# Patient Record
(demographics unavailable — no encounter records)

---

## 2024-11-08 NOTE — ASSESSMENT
[FreeTextEntry1] : This is an 91-year-old female with type 2 diabetes mellitus with retinopathy, nephropathy, thyroid nodules, anemia, hypertension, hyperlipidemia, osteoporosis, here for follow up.  1. Type 2 diabetes mellitus A1c 7.4% -Can continue tresiba 2-4 units qhs if needed -Hold repaglinide and Januvia for now given joint pain -Continue metformin 1g BID -Continue Jardiance 10mg daily. Discussed risks/benefits/side effects. Hold 3 days before surgery or prolonged fast.  2. HLD She takes atorvastatin 10mg qday. LDL slightly above goal but family okay with this slightly elevated number without increasing statin. They want to avoid increasing meds if possible.  3. HTN She was on enalapril 20 mg daily but has been holding this due to lower BP.  4.osteopenia She needs treatment based on FRAX score. Prolia: 3/15/2022, 9/9/2022, 5/9/2023, 11/17/23, 5/10/24, 11/8/24, next due mid-May 2025 She was taking vitamin D 5000 IU twice weekly but stopped this due to Vit D 25 OH over 100. No interval thigh/groin pain, dental issues (has full dentures). No interval falls. No fractures. Repeat DXA reviewed, stable (Sept 2023) Repeat scan  5. Thyroid nodules -thyroid ultrasound Sept 2023 stable -TFTs normal. -Will repeat US now  RTC 6 months  Dev Hooks DO.

## 2024-11-08 NOTE — HISTORY OF PRESENT ILLNESS
[FreeTextEntry1] : She is accompanied by her daughter. CC: Diabetes This is an 91-year-old female with type 2 diabetes mellitus with retinopathy, nephropathy, hypertension, hyponatremia, anemia, thyroid nodules, hyperlipidemia, osteoporosis, here for follow up.  Type 2 diabetes mellitus was diagnosed at the age of 56. She is currently on metformin  mg 2 tablets 2 times a day, now off repaglinide due to joint pain as well as Januvia. Uses Tresiba 2-4 units qhs if BG above 160. On Jardiance 10mg daily. She developed itching and diarrhea on Lantus.  She was on glipizide in the past.  She developed diarrhea on immediate release metformin. No hx of DKA. A1c 7.4% She self-monitors blood glucose 1 time a day at 9pm before Tresiba. BG 140s to around 180, occasionally 200 on a bad day. She denies hypoglycemia. In the morning BG closer to 100. She saw her ophthalmologist in 8/2019 and has retinopathy. Had an appt with ophtho for June 2024, no changes, no progression. She has nephropathy and saw a nephrologist however had not had a follow-up appointment in approximately 3 years. Saw nephro recently in 2024, just monitoring.  She denies neuropathy. She takes atorvastatin 10mg qday. LDL 82 but family okay with this without increasing statin. She was on enalapril 20 mg daily but has been holding this due to lower BP. She was on ?fosamax in the past for osteoporosis for appx 7 years and discontinued it approximately age 85.  Prolia: 3/15/2022, 9/9/2022, 5/9/2023, 11/17/23, 5/10/24, 11/8/24, next due mid May 2025. She was taking vitamin D 5000 IU twice weekly but stopped this due to Vit D 25 OH over 100. No interval thigh/groin pain, dental issues (has full dentures). No interval falls. No fractures.  She underwent MRI of the neck in February 2022 for swelling on left mandible, per daughter likely due to salivary gland.  MRI showed bilateral thyroid nodules.  No prior history of thyroid nodules.  Right 2.4 cm thyroid nodule and right 3.8 cm thyroid nodule benign (FNA June 2022). Thyroid US Sept 2023 stable. Due for repeat thyroid US.

## 2025-03-04 NOTE — PHYSICAL EXAM
[No Acute Distress] : no acute distress [Well Developed] : well developed [Well-Appearing] : well-appearing [Normal Sclera/Conjunctiva] : normal sclera/conjunctiva [EOMI] : extraocular movements intact [Supple] : supple [No Respiratory Distress] : no respiratory distress  [No Accessory Muscle Use] : no accessory muscle use [Clear to Auscultation] : lungs were clear to auscultation bilaterally [Normal Rate] : normal rate  [Regular Rhythm] : with a regular rhythm [Normal S1, S2] : normal S1 and S2 [Grossly Normal Strength/Tone] : grossly normal strength/tone [No Focal Deficits] : no focal deficits [Normal Affect] : the affect was normal [Normal Insight/Judgement] : insight and judgment were intact

## 2025-03-04 NOTE — PLAN
[FreeTextEntry1] : #Seizure -F/u with neurologist -Continue levetiracetam   #HTN -Repeat BP improved -EKG done today in office- sinus rhythm, low voltage, infarct- q waves in lead III and V2 -Monitor BP at home -Cardiology consult  #DM2 -A1C, CBC w. diff, CMP, TSH w. rFT4, UA + micro, vitamin b12   Labs drawn in office

## 2025-03-04 NOTE — HISTORY OF PRESENT ILLNESS
[Family Member] : family member [FreeTextEntry1] : f/u after seizure [de-identified] : 93 y/o female presents for follow up after seizure. Daughter at bedside states about 2 months ago patient had episode of altered mental status where patient's mouth was open, making grunting noises, drooling and eyes were staring into space and she was unable to speak. When patient became responsive she did not know who her daughter was. She was evaluated in [facility] in [place] and was prescribed levetiracetam 250 mg (2 tab) BID. Pt has not followed up with a neurologist and daughter reports Brain imaging showed evidence of head trauma in past. Daughter does not have discharge paperwork and requests levetiracetam refill. Daughter states patient's blood pressure was low in the hospital last year and enalapril and amlodipine she discontinued.  As per daughter BP at home - 129/68 but in office patient's blood pressure is high.   Social hx-  wheelchair bound, unable to use walker  Pt lives with daughter and

## 2025-03-24 NOTE — REVIEW OF SYSTEMS
None [Fever] : no fever [Headache] : no headache [Chills] : no chills [Feeling Fatigued] : not feeling fatigued [Blurry Vision] : no blurred vision [Seeing Double (Diplopia)] : no diplopia [SOB] : no shortness of breath [Dyspnea on exertion] : not dyspnea during exertion [Chest Discomfort] : no chest discomfort [Lower Ext Edema] : no extremity edema [Leg Claudication] : no intermittent leg claudication [Palpitations] : no palpitations [Orthopnea] : no orthopnea [PND] : no PND [Syncope] : no syncope [Dizziness] : no dizziness [Tremor] : no tremor was seen [Numbness (Hypoesthesia)] : no numbness [Convulsions] : no convulsions [Tingling (Paresthesia)] : no tingling [Weakness] : no weakness [Limb Weakness (Paresis)] : no limb weakness (Paresis) [Speech Disturbance] : no speech disturbance [Confusion] : no confusion was observed [Memory Lapses Or Loss] : no memory lapses or loss [Depression] : no depression [Anxiety] : no anxiety [Under Stress] : not under stress [Suicidal] : not suicidal [Easy Bleeding] : no tendency for easy bleeding [Swollen Glands] : no swollen glands [Easy Bruising] : no tendency for easy bruising

## 2025-03-24 NOTE — ASSESSMENT
[FreeTextEntry1] : MANISHA TRUONG is a 92 year F with past medical history significant for HTN, HLD, seizure and DM2. She is here for cardiac evaluation. She has chronic limited functional capacity. BP is well controlled at home off meds.   - I reviewed ECG - I reviewed labs - Schedule an echo to evaluate for structural heart disease - continue taking cardiac meds - Increase ambulation as tolerated to aim for approximately 30 minutes of moderate activity 5 days a week.  Heart healthy diet low in sodium, sugars and saturated fats and high in fruits, vegetables and whole grains.  Weight loss.  Patient advised to go to the nearest ED whenever any symptoms persist and/or worsens.  Patient/Family/Caregiver verbalized complete understanding of these instructions.  I spent a total of 45 minutes with more than 50% spent on counseling and coordinating care.  RTO in 6 wks.

## 2025-03-24 NOTE — HISTORY OF PRESENT ILLNESS
[FreeTextEntry1] : MANISHA TRUONG is a 92 y.o. F with medical history significant for HTN (>30yrs), HLD and DM2 She is here for cardiac evaluation. She is here with her daughter who provides most of the history.   [place] [place] for a first episode of seizure. Keppra was started. CT head showed chronic microvascular ischemic changes. Trop was neg. She was not admitted.  She was previously on amlodipine and enalapril. Amlodipine was stopped last year after having a syncopal episode and noted hypotension. The enalapril was stopped about 6 mo ago due to normal BP readings at home off of it and felt that it was best to remain off of it to avoid symptomatic low BP.  BP today is elevated in the office, however, her home BP readings range 105-129/60s mmHg Daughter states that the patient sleeps most of the day.   Denies CP, SOB, palpitations, orthopnea, dizziness, syncope, LH, edema Patient denies changes in her exercise tolerance during the past 6 months. She has very limited functional capacity. She has been mostly on wheelchair during the past 6 months. Prior to this she walked slowly with a walker.   Sleeps with 1 pillow  She denies history of MI, CVA Denies family history of premature ASCVD and /or SCD  No hospitalizations in the past 3 years.  MEDS Atorvastatin 10 mg jardiance 10 mg metformin 500mg  tresiba levetiracetam 250mg    DATA Labs (3/4/25): TSH 2.02, K 4.0, CRE 0.42, AST 17, ALT 7, ALK 80, eGFR 92, , , HDL 73, LDL 77, NON-HDL 99, A1C 7.7, HGB 11.9, HCT 38.2. (1/17/25): Hgb 10.9; Hct 34.2; Plt 391; Cre 0.5; K 4.3; eGFR 88; Trop <3  ECG (3/4/25):  NSR at 84bpm w nl axis, septal Qs (V1-V2) and no STT abn  CTA Head and neck (1/17/25)): No evidence of an acute large vessel IC arterial occlusion. Question asymmetric slightly decreased arborization of the right distal MCA territory in the right temporal occipital region.  Atherosclerosis within the CCA b/l <10%.   CT Head (1/17/25): Moderate chronic microvascular ischemic changes. Hypodensity within the right temporal lobe and temporal occipital lobe likely reflets a chronic infarct with associated ex vacuo dilatation of the right temporal horn. Small chronic lacunar infarct within the left caudate head.

## 2025-03-24 NOTE — PHYSICAL EXAM
[Well Nourished] : well nourished [No Acute Distress] : no acute distress [Normal Venous Pressure] : normal venous pressure [No Carotid Bruit] : no carotid bruit [Normal S1, S2] : normal S1, S2 [No Murmur] : no murmur [No Rub] : no rub [Clear Lung Fields] : clear lung fields [Good Air Entry] : good air entry [No Respiratory Distress] : no respiratory distress  [No Edema] : no edema [No Cyanosis] : no cyanosis [No Clubbing] : no clubbing [No Varicosities] : no varicosities [Moves all extremities] : moves all extremities [No Focal Deficits] : no focal deficits [Normal Speech] : normal speech [Alert and Oriented] : alert and oriented [Frail] : frail

## 2025-03-30 NOTE — PLAN
[FreeTextEntry1] :   #Abnormal UA- May be 2/2 jardiance, UA + micro, urine culture #Thyroid nodule- US thyroid #DM2- Jardiance and metformin  F/u in 3 months

## 2025-03-30 NOTE — PHYSICAL EXAM
[No Acute Distress] : no acute distress [Well Developed] : well developed [Well-Appearing] : well-appearing [Normal Sclera/Conjunctiva] : normal sclera/conjunctiva [EOMI] : extraocular movements intact [Supple] : supple [No Respiratory Distress] : no respiratory distress  [No Accessory Muscle Use] : no accessory muscle use [Clear to Auscultation] : lungs were clear to auscultation bilaterally [Normal Rate] : normal rate  [Regular Rhythm] : with a regular rhythm [Normal S1, S2] : normal S1 and S2 [Soft] : abdomen soft [Non Tender] : non-tender [Non-distended] : non-distended [No Focal Deficits] : no focal deficits [Normal Affect] : the affect was normal [Normal Insight/Judgement] : insight and judgment were intact

## 2025-03-30 NOTE — HISTORY OF PRESENT ILLNESS
[FreeTextEntry1] : f/u UTI [de-identified] : 91 y/o female presents for follow up after abnormal urinalysis. Daughter notes patient has been fatigued which she attributes to levetiracetam. Pt completed bactrim DS BID x 5 days.

## 2025-05-06 NOTE — HISTORY OF PRESENT ILLNESS
[FreeTextEntry1] : She is accompanied by her daughter. CC: Diabetes This is an 92-year-old female with type 2 diabetes mellitus with retinopathy, nephropathy, hypertension, hyponatremia, anemia, thyroid nodules, hyperlipidemia, osteoporosis, here for follow up.  Type 2 diabetes mellitus was diagnosed at the age of 56. She is currently on metformin  mg 2 tablets 2 times a day, now off repaglinide due to joint pain as well as Januvia. Uses Tresiba 2-4 units qhs if BG above 160. On Jardiance 10mg daily. She developed itching and diarrhea on Lantus.  She was on glipizide in the past.  She developed diarrhea on immediate release metformin. No hx of DKA. A1c 7.7% She self-monitors blood glucose 1 time a day at 9pm before Tresiba. BG 140s to around 180, occasionally 200 on a bad day. She denies hypoglycemia. In the morning BG closer to 100. She saw her ophthalmologist in 8/2019 and has retinopathy. Had an appt with ophtho for June 2024, no changes, no progression. She has nephropathy and saw a nephrologist. Saw nephro recently, just monitoring. She denies neuropathy. She takes atorvastatin 10mg qday. LDL 82 but family okay with this without increasing statin. She was on enalapril 20 mg daily but has been holding this due to lower BP. She was on ?fosamax in the past for osteoporosis for appx 7 years and discontinued it approximately age 85.  Prolia: 3/15/2022, 9/9/2022, 5/9/2023, 11/17/23, 5/10/24, 11/8/24, 5/6/25, next due Nov 2025. She was taking vitamin D 5000 IU twice weekly but stopped this due to Vit D 25 OH over 100. No interval thigh/groin pain, dental issues (has full dentures). No interval fractures.  She underwent MRI of the neck in February 2022 for swelling on left mandible, per daughter likely due to salivary gland.  MRI showed bilateral thyroid nodules.  No prior history of thyroid nodules.  Right 2.4 cm thyroid nodule and right 3.8 cm thyroid nodule benign (FNA June 2022). Thyroid US Sept 2023 stable. Due for repeat thyroid US.

## 2025-05-06 NOTE — ASSESSMENT
[FreeTextEntry1] : This is an 91-year-old female with type 2 diabetes mellitus with retinopathy, nephropathy, thyroid nodules, anemia, hypertension, hyperlipidemia, osteoporosis, here for follow up.  1. Type 2 diabetes mellitus A1c 7.4% -Can continue tresiba 2-4 units qhs if needed -Hold repaglinide and Januvia for now given joint pain -Continue metformin 1g BID -Continue Jardiance 10mg daily. Discussed risks/benefits/side effects. Hold 3 days before surgery or prolonged fast.  2. HLD She takes atorvastatin 10mg qday. LDL slightly above goal but family okay with this slightly elevated number without increasing statin. They want to avoid increasing meds if possible.  3. HTN She was on enalapril 20 mg daily but has been holding this due to lower BP.  4.osteopenia She needs treatment based on FRAX score. Prolia: 3/15/2022, 9/9/2022, 5/9/2023, 11/17/23, 5/10/24, 11/8/24, 5/6/25, next due Nov 2025. She was taking vitamin D 5000 IU twice weekly but stopped this due to Vit D 25 OH over 100. No interval thigh/groin pain, dental issues (has full dentures). No interval fractures. She was taking vitamin D 5000 IU twice weekly but stopped this due to Vit D 25 OH over 100. DXA reviewed, stable (Sept 2023) Repeat scan in fall 2025  5. Thyroid nodules -thyroid ultrasound Sept 2023 stable -TFTs normal. -Will repeat US now  RTC 6 months  Dev Hooks DO.

## 2025-06-09 NOTE — HISTORY OF PRESENT ILLNESS
[Post-hospitalization from ___ Hospital] : Post-hospitalization from [unfilled] Hospital [Admitted on: ___] : The patient was admitted on [unfilled] [Discharged on ___] : discharged on [unfilled] [FreeTextEntry2] : 91 y/o female presents for follow up after hospitalization for hyperglycemia and hypersomnolence. Daughter states BGM at home >400 and she was diagnosed with UTI, LULU 2/2 dehydration and RLE DVT - femoral vein (started on Eliquis). Hg- 8 prior to discharge. Daughter states patient also diagnosed with pressure ulcer of low back and left heel prior to discharge and vaginitis.  Meds- nystatin BID, clotrimazole vaginal cream, Silvadene cream, eliquis , vimpat, miconazole, miralax, senna, thiamine  Jardiance, and levetiracetam was discontinued and patient was seen by neurologist and started on vimpat. Daughter states patient appears to be doing better on vimpat, less somnolence. She was placed on pureed diet due to dysphagia. Daughter plans to have health aid assist at home. Lives with mother. Daughter also notes patient had cough prior to and during hospitalization- cough is intermittent and associated with rhinorrhea.

## 2025-06-09 NOTE — ASSESSMENT
[FreeTextEntry1] : 91 y/o female presents for follow up after hospitalization for hyperglycemia and hypersomnolence. Daughter states BGM at home >400 and she was diagnosed with UTI, LULU 2/2 dehydration and RLE DVT - femoral vein (started on Eliquis).

## 2025-06-09 NOTE — PHYSICAL EXAM
[Well Developed] : well developed [No Acute Distress] : no acute distress [Well-Appearing] : well-appearing [Normal Sclera/Conjunctiva] : normal sclera/conjunctiva [EOMI] : extraocular movements intact [No Respiratory Distress] : no respiratory distress  [Supple] : supple [No Accessory Muscle Use] : no accessory muscle use [Normal Rate] : normal rate  [Regular Rhythm] : with a regular rhythm [Normal S1, S2] : normal S1 and S2 [Soft] : abdomen soft [Non Tender] : non-tender [Non-distended] : non-distended [No Masses] : no abdominal mass palpated [No Focal Deficits] : no focal deficits [Grossly Normal Strength/Tone] : grossly normal strength/tone [Normal Affect] : the affect was normal [Normal Insight/Judgement] : insight and judgment were intact [de-identified] : rhonci and rales of left upper lung, decreased breath sounds of left lower lung [de-identified] : erythematous ulcer of mid lumbar/coccyx region of spine, no pus or drainage, ecchymosis like pressure ulcer of left heel

## 2025-06-09 NOTE — PLAN
[FreeTextEntry1] :   #DM2- A1C, CMP, on metformin, Jardiance discontinued, insulin if BGM uncontrolled, continue statin  #LULU- hydration, check cr  #UTI- UA + micro, urine cx #HTN- TSH w. rFT4 #Cough- CXR, CBC w. diff, pulmonary consult if no improvement, CXR from hospitalization reviewed #DVT- hematology consult #Lumbar Pressure ulcer- silvadene, nystatin x 1 week/ #Heel ulcer- routine wash/clean area daily  #Seizure- on vimpat, neuro follow up  Labs drawn in office  F/u in 3 months